# Patient Record
Sex: MALE | Race: WHITE
[De-identification: names, ages, dates, MRNs, and addresses within clinical notes are randomized per-mention and may not be internally consistent; named-entity substitution may affect disease eponyms.]

---

## 2019-05-02 ENCOUNTER — HOSPITAL ENCOUNTER (OUTPATIENT)
Dept: HOSPITAL 92 - SCSRAD | Age: 59
Discharge: HOME | End: 2019-05-02
Attending: FAMILY MEDICINE
Payer: COMMERCIAL

## 2019-05-02 DIAGNOSIS — M25.551: Primary | ICD-10-CM

## 2019-05-02 DIAGNOSIS — M16.11: ICD-10-CM

## 2019-05-02 NOTE — RAD
RIGHT HIP 2 VIEWS:

 

Date:  05/02/19 

 

HISTORY:  

Right hip pain. 

 

FINDINGS/IMPRESSION: 

There are mild degenerative changes. No fracture, dislocation, or bony destruction is seen. 

 

 

POS: [facility]

## 2019-07-12 ENCOUNTER — HOSPITAL ENCOUNTER (OUTPATIENT)
Dept: HOSPITAL 92 - TBSIIMAG | Age: 59
Discharge: HOME | End: 2019-07-12
Attending: NEUROLOGICAL SURGERY
Payer: COMMERCIAL

## 2019-07-12 DIAGNOSIS — M25.551: ICD-10-CM

## 2019-07-12 DIAGNOSIS — M54.5: Primary | ICD-10-CM

## 2019-07-12 DIAGNOSIS — M47.817: ICD-10-CM

## 2019-07-12 DIAGNOSIS — M47.816: ICD-10-CM

## 2019-07-12 PROCEDURE — 72148 MRI LUMBAR SPINE W/O DYE: CPT

## 2019-07-12 NOTE — MRI
MRI Lumbar Spine WO Con



HISTORY: Low back pain and right hip pain.



COMPARISON: Plain film examination of the right hip dated 5/2/2019.



FINDINGS: The vertebral bodies are normal in height. Disc space height appears relatively well area i
n the are some mild disc desiccation changes at L2-3 and L3-4. There is no significant periaortic

adenopathy. The visualized kidneys are normal. Incidental note is made of vertebral body hemangiomas 
of L2



T12-L1: Unremarkable.



L1-2: Degenerative facet changes without canal or foraminal stenosis



L2-3 minimal disc bulge and mild degenerative facet changes are seen with borderline canal narrowing 
no foraminal stenosis



L3-4: Minimal disc bulge with some moderate facet ligaments hypertrophic change associated some borde
rline to minimal canal narrowing no significant foraminal stenosis.



L4-5: There is a moderate degree of canal stenosis at this level which is related to facet and ligame
ntous hypertrophic change. There is also some mild to moderate left foraminal narrowing.



L5-S1: Degenerative facet changes without canal or foraminal narrowing



IMPRESSION: Changes as described above, the most significant of which appear to be at the L4-5 level.




Reported By: Farhan Worthington 

Electronically Signed:  7/12/2019 1:16 PM

## 2019-10-16 ENCOUNTER — HOSPITAL ENCOUNTER (OUTPATIENT)
Dept: HOSPITAL 92 - RAD | Age: 59
Discharge: HOME | End: 2019-10-16
Attending: ORTHOPAEDIC SURGERY
Payer: COMMERCIAL

## 2019-10-16 DIAGNOSIS — M16.11: Primary | ICD-10-CM

## 2019-10-16 PROCEDURE — 77002 NEEDLE LOCALIZATION BY XRAY: CPT

## 2019-10-16 PROCEDURE — 20610 DRAIN/INJ JOINT/BURSA W/O US: CPT

## 2019-10-16 NOTE — RAD
STEROID INJECTION RIGHT HIP:

 

INDICATIONS:

The patient was referred for a steroid injection right hip due to osteoarthritis.  A request was made
 to inject 40 mg of Kenalog into the right hip.

 

FINDINGS:

A 22 gauge spinal needle was used to enter the right hip joint under fluoroscopic guidance. Iodinated
 contrast was injected to confirm adequate position within the hip joint and 40 mg of Kenalog was the
n injected through the 22 gauge needle.

 

PROCEDURE IN DETAIL:

The skin overlying the anterior right hip was prepped and draped in a sterile manner. Local anesthesi
a was administered with Lidocaine. A 22 gauge spinal needle was used to enter the hip joint under flu
oroscopic guidance. The hip joint was entered in the subcapital region of the mid femoral neck. Contr
ast was injected to confirm adequate position within the hip joint and 40 mg of Kenalog was then inje
cted through the needle. The needle was removed and a sterile bandage was applied. There were no prob
lems or complications.

 

POS: [facility]

## 2021-12-20 ENCOUNTER — HOSPITAL ENCOUNTER (OUTPATIENT)
Dept: HOSPITAL 92 - CSHMRI | Age: 61
Discharge: HOME | End: 2021-12-20
Attending: OTOLARYNGOLOGY
Payer: COMMERCIAL

## 2021-12-20 DIAGNOSIS — H93.12: Primary | ICD-10-CM

## 2021-12-20 DIAGNOSIS — H83.8X2: ICD-10-CM

## 2021-12-20 DIAGNOSIS — H90.3: ICD-10-CM

## 2021-12-20 LAB — ESTIMATED GFR-MDRD - POC: 68

## 2021-12-20 PROCEDURE — 70553 MRI BRAIN STEM W/O & W/DYE: CPT

## 2021-12-20 PROCEDURE — 82565 ASSAY OF CREATININE: CPT

## 2024-11-19 ENCOUNTER — HOSPITAL ENCOUNTER (OUTPATIENT)
Dept: HOSPITAL 92 - SCSRAD | Age: 64
Discharge: HOME | End: 2024-11-19
Attending: NEUROLOGICAL SURGERY
Payer: MEDICARE

## 2024-11-19 DIAGNOSIS — M47.812: ICD-10-CM

## 2024-11-19 DIAGNOSIS — Z98.1: ICD-10-CM

## 2024-11-19 DIAGNOSIS — M48.02: ICD-10-CM

## 2024-11-19 DIAGNOSIS — M25.78: ICD-10-CM

## 2024-11-19 DIAGNOSIS — M48.03: ICD-10-CM

## 2024-11-19 DIAGNOSIS — M50.30: Primary | ICD-10-CM

## 2024-11-19 DIAGNOSIS — Z98.890: ICD-10-CM

## 2024-11-19 PROCEDURE — 72040 X-RAY EXAM NECK SPINE 2-3 VW: CPT
